# Patient Record
Sex: MALE | Race: OTHER | HISPANIC OR LATINO | URBAN - METROPOLITAN AREA
[De-identification: names, ages, dates, MRNs, and addresses within clinical notes are randomized per-mention and may not be internally consistent; named-entity substitution may affect disease eponyms.]

---

## 2023-06-29 ENCOUNTER — EMERGENCY (EMERGENCY)
Facility: HOSPITAL | Age: 43
LOS: 1 days | Discharge: HOME | End: 2023-06-29
Attending: EMERGENCY MEDICINE | Admitting: EMERGENCY MEDICINE
Payer: OTHER MISCELLANEOUS

## 2023-06-29 VITALS
HEART RATE: 79 BPM | TEMPERATURE: 98 F | SYSTOLIC BLOOD PRESSURE: 158 MMHG | DIASTOLIC BLOOD PRESSURE: 94 MMHG | OXYGEN SATURATION: 95 % | RESPIRATION RATE: 17 BRPM

## 2023-06-29 PROCEDURE — 99284 EMERGENCY DEPT VISIT MOD MDM: CPT

## 2023-06-29 NOTE — ED ADULT TRIAGE NOTE - CHIEF COMPLAINT QUOTE
pt. c/o feeling dizzy while working states he hasn't eaten much and has been going up and down the stairs at his job. Pt. AOX4 in no acute distress.

## 2023-06-29 NOTE — ED ADULT NURSE NOTE - NSFALLUNIVINTERV_ED_ALL_ED
Bed/Stretcher in lowest position, wheels locked, appropriate side rails in place/Call bell, personal items and telephone in reach/Instruct patient to call for assistance before getting out of bed/chair/stretcher/Non-slip footwear applied when patient is off stretcher/[place] to call system/Physically safe environment - no spills, clutter or unnecessary equipment/Purposeful proactive rounding/Room/bathroom lighting operational, light cord in reach

## 2023-06-30 VITALS
SYSTOLIC BLOOD PRESSURE: 130 MMHG | TEMPERATURE: 98 F | HEART RATE: 57 BPM | OXYGEN SATURATION: 98 % | DIASTOLIC BLOOD PRESSURE: 80 MMHG | RESPIRATION RATE: 16 BRPM

## 2023-06-30 LAB
ALBUMIN SERPL ELPH-MCNC: 3.8 G/DL — SIGNIFICANT CHANGE UP (ref 3.4–5)
ALP SERPL-CCNC: 99 U/L — SIGNIFICANT CHANGE UP (ref 40–120)
ALT FLD-CCNC: 31 U/L — SIGNIFICANT CHANGE UP (ref 12–42)
ANION GAP SERPL CALC-SCNC: 6 MMOL/L — LOW (ref 9–16)
AST SERPL-CCNC: 17 U/L — SIGNIFICANT CHANGE UP (ref 15–37)
BASOPHILS # BLD AUTO: 0.02 K/UL — SIGNIFICANT CHANGE UP (ref 0–0.2)
BASOPHILS NFR BLD AUTO: 0.5 % — SIGNIFICANT CHANGE UP (ref 0–2)
BILIRUB SERPL-MCNC: 0.6 MG/DL — SIGNIFICANT CHANGE UP (ref 0.2–1.2)
BUN SERPL-MCNC: 22 MG/DL — SIGNIFICANT CHANGE UP (ref 7–23)
CALCIUM SERPL-MCNC: 8.6 MG/DL — SIGNIFICANT CHANGE UP (ref 8.5–10.5)
CHLORIDE SERPL-SCNC: 103 MMOL/L — SIGNIFICANT CHANGE UP (ref 96–108)
CO2 SERPL-SCNC: 28 MMOL/L — SIGNIFICANT CHANGE UP (ref 22–31)
CREAT SERPL-MCNC: 0.83 MG/DL — SIGNIFICANT CHANGE UP (ref 0.5–1.3)
EGFR: 111 ML/MIN/1.73M2 — SIGNIFICANT CHANGE UP
EOSINOPHIL # BLD AUTO: 0.14 K/UL — SIGNIFICANT CHANGE UP (ref 0–0.5)
EOSINOPHIL NFR BLD AUTO: 3.3 % — SIGNIFICANT CHANGE UP (ref 0–6)
GLUCOSE SERPL-MCNC: 117 MG/DL — HIGH (ref 70–99)
HCT VFR BLD CALC: 41.6 % — SIGNIFICANT CHANGE UP (ref 39–50)
HGB BLD-MCNC: 13.8 G/DL — SIGNIFICANT CHANGE UP (ref 13–17)
IMM GRANULOCYTES NFR BLD AUTO: 0.2 % — SIGNIFICANT CHANGE UP (ref 0–0.9)
LYMPHOCYTES # BLD AUTO: 1.34 K/UL — SIGNIFICANT CHANGE UP (ref 1–3.3)
LYMPHOCYTES # BLD AUTO: 31.5 % — SIGNIFICANT CHANGE UP (ref 13–44)
MCHC RBC-ENTMCNC: 30.5 PG — SIGNIFICANT CHANGE UP (ref 27–34)
MCHC RBC-ENTMCNC: 33.2 GM/DL — SIGNIFICANT CHANGE UP (ref 32–36)
MCV RBC AUTO: 91.8 FL — SIGNIFICANT CHANGE UP (ref 80–100)
MONOCYTES # BLD AUTO: 0.29 K/UL — SIGNIFICANT CHANGE UP (ref 0–0.9)
MONOCYTES NFR BLD AUTO: 6.8 % — SIGNIFICANT CHANGE UP (ref 2–14)
NEUTROPHILS # BLD AUTO: 2.46 K/UL — SIGNIFICANT CHANGE UP (ref 1.8–7.4)
NEUTROPHILS NFR BLD AUTO: 57.7 % — SIGNIFICANT CHANGE UP (ref 43–77)
NRBC # BLD: 0 /100 WBCS — SIGNIFICANT CHANGE UP (ref 0–0)
PLATELET # BLD AUTO: 199 K/UL — SIGNIFICANT CHANGE UP (ref 150–400)
POTASSIUM SERPL-MCNC: 4.2 MMOL/L — SIGNIFICANT CHANGE UP (ref 3.5–5.3)
POTASSIUM SERPL-SCNC: 4.2 MMOL/L — SIGNIFICANT CHANGE UP (ref 3.5–5.3)
PROT SERPL-MCNC: 6.9 G/DL — SIGNIFICANT CHANGE UP (ref 6.4–8.2)
RBC # BLD: 4.53 M/UL — SIGNIFICANT CHANGE UP (ref 4.2–5.8)
RBC # FLD: 13.2 % — SIGNIFICANT CHANGE UP (ref 10.3–14.5)
SODIUM SERPL-SCNC: 137 MMOL/L — SIGNIFICANT CHANGE UP (ref 132–145)
TROPONIN I, HIGH SENSITIVITY RESULT: 146.5 NG/L — HIGH
TROPONIN I, HIGH SENSITIVITY RESULT: 150.3 NG/L — HIGH
TROPONIN I, HIGH SENSITIVITY RESULT: 151.5 NG/L — HIGH
WBC # BLD: 4.26 K/UL — SIGNIFICANT CHANGE UP (ref 3.8–10.5)
WBC # FLD AUTO: 4.26 K/UL — SIGNIFICANT CHANGE UP (ref 3.8–10.5)

## 2023-06-30 PROCEDURE — 75574 CT ANGIO HRT W/3D IMAGE: CPT | Mod: 26

## 2023-06-30 PROCEDURE — 71046 X-RAY EXAM CHEST 2 VIEWS: CPT | Mod: 26

## 2023-06-30 PROCEDURE — 99236 HOSP IP/OBS SAME DATE HI 85: CPT

## 2023-06-30 RX ORDER — ACETAMINOPHEN 500 MG
650 TABLET ORAL ONCE
Refills: 0 | Status: COMPLETED | OUTPATIENT
Start: 2023-06-30 | End: 2023-06-30

## 2023-06-30 RX ORDER — MECLIZINE HCL 12.5 MG
25 TABLET ORAL ONCE
Refills: 0 | Status: COMPLETED | OUTPATIENT
Start: 2023-06-30 | End: 2023-06-30

## 2023-06-30 RX ORDER — NITROGLYCERIN 6.5 MG
0.4 CAPSULE, EXTENDED RELEASE ORAL ONCE
Refills: 0 | Status: COMPLETED | OUTPATIENT
Start: 2023-06-30 | End: 2023-06-30

## 2023-06-30 RX ORDER — ATORVASTATIN CALCIUM 80 MG/1
1 TABLET, FILM COATED ORAL
Qty: 30 | Refills: 0
Start: 2023-06-30 | End: 2023-07-29

## 2023-06-30 RX ORDER — ASPIRIN/CALCIUM CARB/MAGNESIUM 324 MG
1 TABLET ORAL
Qty: 30 | Refills: 0
Start: 2023-06-30 | End: 2023-07-29

## 2023-06-30 RX ORDER — SODIUM CHLORIDE 9 MG/ML
1000 INJECTION INTRAMUSCULAR; INTRAVENOUS; SUBCUTANEOUS ONCE
Refills: 0 | Status: COMPLETED | OUTPATIENT
Start: 2023-06-30 | End: 2023-06-30

## 2023-06-30 RX ORDER — ASPIRIN/CALCIUM CARB/MAGNESIUM 324 MG
324 TABLET ORAL ONCE
Refills: 0 | Status: COMPLETED | OUTPATIENT
Start: 2023-06-30 | End: 2023-06-30

## 2023-06-30 RX ADMIN — Medication 650 MILLIGRAM(S): at 11:34

## 2023-06-30 RX ADMIN — Medication 324 MILLIGRAM(S): at 02:09

## 2023-06-30 RX ADMIN — Medication 25 MILLIGRAM(S): at 00:50

## 2023-06-30 RX ADMIN — Medication 0.4 MILLIGRAM(S): at 11:10

## 2023-06-30 RX ADMIN — SODIUM CHLORIDE 1000 MILLILITER(S): 9 INJECTION INTRAMUSCULAR; INTRAVENOUS; SUBCUTANEOUS at 00:51

## 2023-06-30 NOTE — ED CDU PROVIDER DISPOSITION NOTE - CLINICAL COURSE
Discussed CCTA results with Dr. Hunt; probably no major obstruction.  Discussed case with Dr. Dahl who recommends starting pt on ASA and statin, plan for close outpatient follow up in office.  However if pt does not have insurance, can admit for workup.    Pt does not have insurance, willing to stay in hospital only until tomorrow early morning as he needs to  his kids from the police station tomorrow morning at 10am.  Spoke with Dr. Remy from cardiology; cannot get Echo tonight (Friday evening).  Will refer to [place] and give info for [place]/ [facility].

## 2023-06-30 NOTE — ED PROVIDER NOTE - PROGRESS NOTE DETAILS
Troponin came back positive.  It may be from excessive stair climbing this evening.  Re-interviewed pt and he reports no chest pain at any time and no palpitations.  He reports having some L sided chest pain 1 week ago and went to see his doctor.  He was told that he "slept on it wrong."  EKG and troponin repeated.  Aspirin given just in case.  Remains on monitor. Repeat troponin the same.  I discussed with the patient my want to do a CTA of coronaries in the morning.  He agrees with plan.  We will place him on observation and perform CTA in AM.  Will also do serial troponin checked and continuous tele monitoring.

## 2023-06-30 NOTE — ED CDU PROVIDER DISPOSITION NOTE - NSFOLLOWUPINSTRUCTIONS_ED_ALL_ED_FT
It is important to take ASPIRIN and a STATIN EVERY DAY.    Please follow up with Dr. Dahl or any cardiologist within the next week for further evaluation.    [facility] / [place]  [phone]    [facility]  [phone]          Angina  Angina    La angina, o angina de pecho, es un malestar que se siente en el pecho, el delia, el brazo, la mandíbula o la espalda. Esta molestia es causada por la falta de luis en la capa media de la pared del corazón (miocardio).    Hay cuatro tipos de angina de pecho:  Angina estable. Se desencadena con actividad o ejercicio vigoroso. Desaparece cuando descansa o marcus medicamentos para la angina de pecho. Se le diagnostica si ha tenido el síntoma wesly más de 2 meses.  Angina inestable. Es un signo de advertencia y puede provocar un infarto de miocardio. Morris es tania emergencia médica. Los síntomas aparecen wesly el reposo y tienen tania duración prolongada.  Angina microvascular. Afecta las arterias coronarias pequeñas. Los síntomas incluyen dolor en el pecho, sensación de cansancio y falta de aire. Los síntomas pueden durar mucho o poco tiempo.  Angina de Prinzmetal o angina variante. Es producida por un espasmo de las arterias que van al corazón.  ¿Cuáles son las causas?  Esta afección suele ser causada por la aterosclerosis. Es la acumulación de grasa y colesterol (placa) en las arterias. La placa puede angostar u obstruir la arteria.    Entre otras causas de la angina se incluyen las siguientes:  Espasmos repentinos de los músculos de las arterias en el corazón.  Enfermedad de arteria pequeña (disfunción microvascular).  Problemas con cualquiera de noreen válvulas cardíacas.  Un desgarro en tania arteria en el corazón (disección de la arteria coronaria).  Debilidad del músculo cardíaco (miocardiopatía).  ¿Qué incrementa el riesgo?  Es más probable que tenga esta afección si presenta:  Colesterol alto.  Presión arterial bk.  Diabetes.  Antecedentes familiares de enfermedades cardíacas.  Estilo de darhsana sedentario, es decir que no se hace suficiente ejercicio físico.  Depresión.  Recibió tratamiento de radiación en el costado janiya del pecho.  Otros factores de riesgo son los siguientes:  Consumir tabaco.  Ser waqar.  Seguir tania dieta con alto contenido de grasas saturadas.  Estar expuesto a un alto nivel de estrés o a disparadores de estrés.  Usar drogas, yaya cocaína.  Las mujeres presentan un riesgo más alto de tener angina si:  Son mayores de 55 años de edad.  Harry atravesado la menopausia.  ¿Cuáles son los signos o síntomas?  Entre los síntomas frecuentes de esta afección en hombres y mujeres se incluyen los siguientes:  Dolor de pecho que puede:  Sentirse yaya un dolor que aplasta o retuerce el pecho, o ankita tania opresión, presión, distensión o pesadez en el pecho.  Durar algo más de unos minutos o detenerse y regresar en el transcurso de unos minutos.  Dolor en el delia, el brazo, la mandíbula o la espalda.  Acidez estomacal o indigestión sin causa aparente.  Falta de aire.  Náuseas.  Sudor frío repentino.  Las mujeres y las personas con diabetes pueden tener síntomas inusuales (atípicos) yaya:  Fatiga.  Sensación inexplicable de nerviosismo o ansiedad.  Debilidad sin causa aparente.  Mareos o desmayos.  ¿Cómo se diagnostica?  Esta afección se puede diagnosticar en función de lo siguiente:  Los síntomas y los antecedentes médicos.  Un electrocardiograma (ECG) para medir la actividad eléctrica del corazón.  Análisis de luis.  Tania ergometría para buscar signos de obstrucción cuando se exige al corazón.  Un angiograma por TC para examinar el corazón y el torrente sanguíneo hacia el corazón.  Un angiograma coronario para determinar si hay obstrucción arterial.  Ecocardiograma (ecografía) para evaluar la fuerza de los latidos cardíacos.  ¿Cómo se trata?  La angina puede tratarse con lo siguiente:  Medicamentos para:  Prevenir los coágulos de luis y el infarto de miocardio.  Relajar los vasos sanguíneos y mejorar la circulación sanguínea hacia el corazón.  Reducir la presión arterial, mejorar el bombeo del corazón y relajar los espasmos de los vasos sanguíneos.  Reducir el colesterol y ayudar a tratar la aterosclerosis.  Un procedimiento utilizado para dilatar tania arteria coronaria estrechada u obstruida (angioplastia). Se puede colocar un tubo de mildred (stent) en la arteria coronaria para mantenerla abierta.  Cirugía para permitir que la luis circule a través de tania obstrucción en tania arteria (cirugía de revascularización arterial coronaria).  Siga estas instrucciones en abraham casa:  Medicamentos    Use los medicamentos de venta filiberto y los recetados solamente yaya se lo haya indicado el médico.  No tome los siguientes medicamentos a menos que el médico lo autorice:  Antiinflamatorios no esteroideos ([place]), yaya ibuprofeno o naproxeno.  Suplementos vitamínicos que contienen vitamina A, vitamina E o ambas.  Terapia de reemplazo hormonal que contiene estrógeno, con o sin progestina.  Comida y bebida      Siga tania dieta cardiosaludable. Esta debe incluir muchas frutas y verduras frescas, cereales integrales, proteínas (magras) con bajo contenido de grasa y productos lácteos bajos en grasa.  Siga las indicaciones del médico respecto de las restricciones en las comidas o las bebidas.  Actividad    Siga un programa de actividad física aprobado por el médico.  Considere la posibilidad de participar en un programa de rehabilitación cardíaca.  Cuando se sienta fatigado, tome un descanso. Programa períodos de descanso en las actividades diarias.  Estilo de darshana      No consuma ningún producto que contenga nicotina o tabaco. Estos productos incluyen cigarrillos, tabaco para mascar y aparatos de vapeo, yaya los cigarrillos electrónicos. Si necesita ayuda para dejar de consumir estos productos, consulte al médico.  Si el médico dice que puede beber alcohol:  Limite la cantidad que jacquie a lo siguiente:  De 0 a 1 medida por día para las mujeres que no están embarazadas.  De 0 a 2 medidas por día para los hombres.  Esté atento a la cantidad de alcohol que hay en las bebidas que marcus. En los [place], tania medida equivale a tania botella de cerveza de 12 oz (355 ml), un vaso de vino de 5 oz (148 ml) o un vaso de tania bebida alcohólica de bk graduación de 1½ oz (44 ml).  Indicaciones generales    Mantenga un peso saludable.  Aprenda a manejar el estrés.  Mantenga las vacunas al día. Vacúnese contra la gripe (influenza) todos los años.  Hable con el médico si se siente deprimido. Para saber si corre riesgo de sufrir depresión, realícese tania prueba de detección.  Trabaje con el médico para controlar otros problemas de maikel, yaya hipertensión y diabetes.  Cumpla con todas las visitas de seguimiento. Morris es importante.  Solicite ayuda de inmediato si:  Tiene dolor en el pecho, el delia, el brazo, la mandíbula o la espalda, y el dolor:  Dura más de unos minutos.  Es recurrente.  No se rebeca después de naomi medicamentos sublinguales (nitroglicerina sublingual).  Aumenta en intensidad o frecuencia.  Tiene sudoración intensa sin causa aparente.  Tiene estos síntomas sin causa aparente:  Acidez estomacal o indigestión.  Falta de aire o dificultades respiratorias.  Náuseas o vómitos.  Fatiga.  Nerviosismo o ansiedad.  Debilidad.  Mareos o sensación de desvanecimiento repentinos.  Se desmaya.  Estos síntomas pueden representar un problema grave que constituye tania emergencia. No espere a amanda si los síntomas desaparecen. Solicite atención médica de inmediato. Comuníquese con el servicio de emergencias de abraham localidad (911 en los [place]). No conduzca por noreen propios medios hasta el hospital.    Resumen  La angina, o angina de pecho, es un malestar que se siente en el pecho, el delia, el brazo, la mandíbula o la espalda debido a la falta de luis en las arterias de la pared del corazón.  Hay muchos síntomas de angina. Algunos son dolor en el pecho, acidez estomacal o indigestión inexplicables, sudor frío repentino y fatiga.  La angina de pecho puede tratarse con cambios en el estilo de darshana, medicamentos o cirugía.  Los síntomas de angina pueden representar tania emergencia. Solicite atención médica de inmediato. Comuníquese con el servicio de emergencias de abraham localidad (911 en los [place]). No conduzca por noreen propios medios hasta el hospital.  Esta información no tiene yaya fin reemplazar el consejo del médico. Asegúrese de hacerle al médico cualquier pregunta que tenga.

## 2023-06-30 NOTE — ED ADULT NURSE REASSESSMENT NOTE - NS ED NURSE REASSESS COMMENT FT1
Pt. received resting in stretcher with eyes closed breathing at ease on room air with visible chest rise. Pending CTA in the AM.

## 2023-06-30 NOTE — ED CDU PROVIDER DISPOSITION NOTE - CARE PROVIDER_API CALL
Gunnar Dahl  Cardiovascular Disease  [address] [facility], [address] [place] [address]  Phone: [phone]  Fax: [phone]  Follow Up Time: 4-6 Days

## 2023-06-30 NOTE — ED PROVIDER NOTE - OBJECTIVE STATEMENT
Pt is a 41yo M with a h/o vertigo and p/w dizziness.  Pt reports he was working tonight and had a gradual worsening of dizziness.  He works as a food runner in a large restaurant and reports he was unable to use their elevator tonight so had to go up 3-4 flights of stairs "probably 100 times."  He felt "like the room began to move" and confirms room spinning sensation c/w his vertigo.  He has taken meclizine in the past with good relief but did not have any on him.  He also reports that when the vertigo came on he felt like he almost was going to pass out and had to sit.  Denies any LOC.  Denies any palpitations or CP at any point.  Denies eating dinner this evening.  Ate a full meal while waiting to be seen here and tolerates PO without issue.  Dizziness however persists.

## 2023-06-30 NOTE — ED PROVIDER NOTE - PHYSICAL EXAMINATION
VITAL SIGNS: I have reviewed nursing notes and confirm.   CONST: Well-developed; well-nourished; No apparent distress.  ENT: No nasal discharge; airway clear.  HEAD: Normocephalic; atraumatic.  EYES: Sclera clear. Pupils round and symmetrical bilaterally.  CARD: S1, S2 normal; no murmurs, gallops, or rubs. Sinus bradycardia.  RESP: No wheezes, rales or rhonchi. Breathing comfortably; speaking in full sentences.   ABD: Soft; non-distended; non-tender; no rebound or guarding.  : No CVA tenderness bilaterally.  MSK: No acute deformities noted to extremities. No tenderness to cervical/thoracic/lumbar spine to palpation.  NEURO: Alert, oriented. Speech is fluent and appropriate. Moving all extremities appropriately. No gross motor or sensory abnormalities.   SKIN: Skin is normal temperature; no diaphoresis; no pallor.   PSYCH: Cooperative. Appropriate mood, language, and behavior.

## 2023-06-30 NOTE — ED CDU PROVIDER INITIAL DAY NOTE - CLINICAL SUMMARY MEDICAL DECISION MAKING FREE TEXT BOX
Patient with vertigo-like dizziness and near syncope.  Vertigo improved with meclizine.  Troponin came back elevated.  EKG is nonischemic.  Will remain on cardiac monitor, get serial tropes, and get a CTA of coronaries in the morning.  We will consult cardiology as needed.

## 2023-06-30 NOTE — ED PROVIDER NOTE - CARE PLAN
1 Principal Discharge DX:	Vertigo  Secondary Diagnosis:	Near syncope   Principal Discharge DX:	Vertigo  Secondary Diagnosis:	Near syncope  Secondary Diagnosis:	Elevated troponin

## 2023-06-30 NOTE — ED PROVIDER NOTE - CLINICAL SUMMARY MEDICAL DECISION MAKING FREE TEXT BOX
Vertigo with near syncope.  We will check labs, place on cardiac monitor, and give IV hydration and meclizine for sxs.

## 2023-07-03 DIAGNOSIS — R77.8 OTHER SPECIFIED ABNORMALITIES OF PLASMA PROTEINS: ICD-10-CM

## 2023-07-03 DIAGNOSIS — R00.1 BRADYCARDIA, UNSPECIFIED: ICD-10-CM

## 2023-07-03 DIAGNOSIS — R42 DIZZINESS AND GIDDINESS: ICD-10-CM

## 2023-07-03 DIAGNOSIS — R55 SYNCOPE AND COLLAPSE: ICD-10-CM

## 2023-07-19 PROBLEM — R42 DIZZINESS AND GIDDINESS: Chronic | Status: ACTIVE | Noted: 2023-06-30

## 2023-07-19 PROBLEM — Z00.00 ENCOUNTER FOR PREVENTIVE HEALTH EXAMINATION: Status: ACTIVE | Noted: 2023-07-19

## 2023-07-27 NOTE — REASON FOR VISIT
[FreeTextEntry1] : CV Data Reviewed:\par \par ECG :\par \par TTE :\par \par Stress Tests: None available\par \par Other Imaging: None available\par

## 2023-07-28 ENCOUNTER — APPOINTMENT (OUTPATIENT)
Dept: HEART AND VASCULAR | Facility: CLINIC | Age: 43
End: 2023-07-28
Payer: COMMERCIAL

## 2023-08-11 ENCOUNTER — NON-APPOINTMENT (OUTPATIENT)
Age: 43
End: 2023-08-11

## 2023-08-11 ENCOUNTER — APPOINTMENT (OUTPATIENT)
Dept: HEART AND VASCULAR | Facility: CLINIC | Age: 43
End: 2023-08-11
Payer: COMMERCIAL

## 2023-08-11 VITALS
OXYGEN SATURATION: 97 % | WEIGHT: 180.8 LBS | BODY MASS INDEX: 25.31 KG/M2 | HEART RATE: 53 BPM | HEIGHT: 71 IN | DIASTOLIC BLOOD PRESSURE: 77 MMHG | SYSTOLIC BLOOD PRESSURE: 115 MMHG | TEMPERATURE: 98.5 F

## 2023-08-11 PROCEDURE — 99203 OFFICE O/P NEW LOW 30 MIN: CPT | Mod: 25

## 2023-08-11 PROCEDURE — 93000 ELECTROCARDIOGRAM COMPLETE: CPT

## 2023-08-11 NOTE — HISTORY OF PRESENT ILLNESS
[FreeTextEntry1] : 42M with no known medical/surgical hx who presents after an ED visit for dizziness/vertigo.  - No LOC, chest pain, PINEDO, palpitations.  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

## 2023-08-11 NOTE — SIGNATURES
[TextEntry] : Maurice Spaulding MD Cardiologist [facility] [address] [place] [address] Office tel: [phone]

## 2023-08-11 NOTE — PHYSICAL EXAM
[Well Developed] : well developed [Well Nourished] : well nourished [No Acute Distress] : no acute distress [Normal Conjunctiva] : normal conjunctiva [Normal Venous Pressure] : normal venous pressure [No Carotid Bruit] : no carotid bruit [Normal S1, S2] : normal S1, S2 [No Murmur] : no murmur [No Rub] : no rub [Clear Lung Fields] : clear lung fields [No Gallop] : no gallop [Good Air Entry] : good air entry [No Respiratory Distress] : no respiratory distress  [Soft] : abdomen soft [No Masses/organomegaly] : no masses/organomegaly [Non Tender] : non-tender [Normal Bowel Sounds] : normal bowel sounds [Normal Gait] : normal gait [No Edema] : no edema [No Cyanosis] : no cyanosis [No Varicosities] : no varicosities [No Clubbing] : no clubbing [No Rash] : no rash [No Skin Lesions] : no skin lesions [Moves all extremities] : moves all extremities [No Focal Deficits] : no focal deficits [Alert and Oriented] : alert and oriented [Normal Speech] : normal speech [Normal memory] : normal memory

## 2023-08-11 NOTE — REASON FOR VISIT
[FreeTextEntry1] : CV Data Reviewed: ECG: TTE: None Stress Tests: None available Other Imaging: CTA 1.  The calcium score is 75 Agatston units. 2.  The proximal RCA and mid to distal LCx/OM2 are probably non-obstructive 3.  Remaining coronary segments appear normal

## 2023-08-14 LAB
ALBUMIN SERPL ELPH-MCNC: 4.8 G/DL
ALP BLD-CCNC: 103 U/L
ALT SERPL-CCNC: 26 U/L
ANION GAP SERPL CALC-SCNC: 10 MMOL/L
AST SERPL-CCNC: 21 U/L
BILIRUB SERPL-MCNC: 0.5 MG/DL
BUN SERPL-MCNC: 21 MG/DL
CALCIUM SERPL-MCNC: 9.8 MG/DL
CHLORIDE SERPL-SCNC: 107 MMOL/L
CHOLEST SERPL-MCNC: 184 MG/DL
CO2 SERPL-SCNC: 27 MMOL/L
CREAT SERPL-MCNC: 0.87 MG/DL
EGFR: 109 ML/MIN/1.73M2
GLUCOSE SERPL-MCNC: 90 MG/DL
HDLC SERPL-MCNC: 47 MG/DL
LDLC SERPL CALC-MCNC: 106 MG/DL
NONHDLC SERPL-MCNC: 136 MG/DL
POTASSIUM SERPL-SCNC: 4.5 MMOL/L
PROT SERPL-MCNC: 7.3 G/DL
SODIUM SERPL-SCNC: 144 MMOL/L
TRIGL SERPL-MCNC: 173 MG/DL

## 2023-10-13 ENCOUNTER — APPOINTMENT (OUTPATIENT)
Dept: HEART AND VASCULAR | Facility: CLINIC | Age: 43
End: 2023-10-13

## 2023-10-13 DIAGNOSIS — R42 DIZZINESS AND GIDDINESS: ICD-10-CM

## 2023-10-17 ENCOUNTER — APPOINTMENT (OUTPATIENT)
Dept: HEART AND VASCULAR | Facility: CLINIC | Age: 43
End: 2023-10-17
Payer: COMMERCIAL

## 2023-10-17 PROCEDURE — 93306 TTE W/DOPPLER COMPLETE: CPT

## 2023-10-20 ENCOUNTER — APPOINTMENT (OUTPATIENT)
Dept: HEART AND VASCULAR | Facility: CLINIC | Age: 43
End: 2023-10-20
Payer: COMMERCIAL

## 2023-10-20 VITALS
WEIGHT: 168.31 LBS | SYSTOLIC BLOOD PRESSURE: 120 MMHG | HEIGHT: 71 IN | OXYGEN SATURATION: 97 % | HEART RATE: 46 BPM | BODY MASS INDEX: 23.56 KG/M2 | DIASTOLIC BLOOD PRESSURE: 72 MMHG

## 2023-10-20 DIAGNOSIS — I25.84 ATHEROSCLEROTIC HEART DISEASE OF NATIVE CORONARY ARTERY W/OUT ANGINA PECTORIS: ICD-10-CM

## 2023-10-20 DIAGNOSIS — I25.10 ATHEROSCLEROTIC HEART DISEASE OF NATIVE CORONARY ARTERY W/OUT ANGINA PECTORIS: ICD-10-CM

## 2023-10-20 DIAGNOSIS — M79.10 MYALGIA, UNSPECIFIED SITE: ICD-10-CM

## 2023-10-20 PROCEDURE — 99213 OFFICE O/P EST LOW 20 MIN: CPT

## 2023-10-20 RX ORDER — IBUPROFEN 200 MG/1
200 TABLET ORAL EVERY 6 HOURS
Qty: 30 | Refills: 0 | Status: ACTIVE | COMMUNITY
Start: 2023-10-20 | End: 1900-01-01

## 2023-10-20 RX ORDER — ATORVASTATIN CALCIUM 40 MG/1
40 TABLET, FILM COATED ORAL
Qty: 90 | Refills: 3 | Status: ACTIVE | COMMUNITY
Start: 2023-08-11 | End: 1900-01-01

## 2023-10-24 LAB
CHOLEST SERPL-MCNC: 106 MG/DL
HDLC SERPL-MCNC: 55 MG/DL
LDLC SERPL CALC-MCNC: 39 MG/DL
NONHDLC SERPL-MCNC: 51 MG/DL
TRIGL SERPL-MCNC: 53 MG/DL